# Patient Record
(demographics unavailable — no encounter records)

---

## 2025-01-29 NOTE — HISTORY OF PRESENT ILLNESS
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 Doing well overall.   #Gyn/Sexual Hx Menses: Cycles are regular, no sig dysmenorrhea Sexually active: Not currently  Contraception: Mirena IUD History of STIs: HPV Oocyte cryopreservation May/June 2024, retrieved 60 eggs w/ possible overstimulation  #History of anemia Hb ~8 June 2024 Unknown etiology  #HCM - Pap smear: UTD - Vaccines: HPV - received / Tdap - likely UTD given HCW

## 2025-01-29 NOTE — HISTORY OF PRESENT ILLNESS
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 Doing well overall.   #Gyn/Sexual Hx Menses: Cycles are regular, no sig dysmenorrhea Sexually active: Not currently  Contraception: Mirena IUD History of STIs: HPV Oocyte cryopreservation May/June 2024, retrieved 60 eggs w/ possible overstimulation  #History of anemia Hb ~8 June 2024 Unknown etiology  #HCM - Pap smear: UTD - Vaccines: HPV - received / Tdap - likely UTD given HCW

## 2025-01-29 NOTE — PHYSICAL EXAM
[No Acute Distress] : no acute distress [Well Developed] : well developed [Well Nourished] : well nourished [Well-Appearing] : well-appearing [Normal Voice/Communication] : normal voice/communication [Normal Sclera/Conjunctiva] : normal sclera/conjunctiva [EOMI] : extraocular movements intact [Normal Outer Ear/Nose] : the outer ears and nose were normal in appearance [No JVD] : no jugular venous distention [No Lymphadenopathy] : no lymphadenopathy [Supple] : supple [Thyroid Normal, No Nodules] : the thyroid was normal and there were no nodules present [No Respiratory Distress] : no respiratory distress  [Normal Rate] : normal rate  [Clear to Auscultation] : lungs were clear to auscultation bilaterally [Regular Rhythm] : with a regular rhythm [Normal S1, S2] : normal S1 and S2 [No Murmur] : no murmur heard [No Edema] : there was no peripheral edema [Soft] : abdomen soft [Non Tender] : non-tender [Non-distended] : non-distended [No Masses] : no abdominal mass palpated [No HSM] : no HSM [No Joint Swelling] : no joint swelling [Normal Gait] : normal gait [Alert and Oriented x3] : oriented to person, place, and time [Normal Insight/Judgement] : insight and judgment were intact

## 2025-01-29 NOTE — HISTORY OF PRESENT ILLNESS
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 [de-identified] : #History of depression, anxiety, ADHD Wellbutrin XL 450mg QD Adderall XR 40mg QD + 5mg IR PRN  Had a therapist in med school + residency but not any longer Sleeps well. Doing well overall.   #Gyn/Sexual Hx Menses: Cycles are regular, no sig dysmenorrhea Sexually active: Not currently  Contraception: Mirena IUD History of STIs: HPV Oocyte cryopreservation May/June 2024, retrieved 60 eggs w/ possible overstimulation  #History of anemia Hb ~8 June 2024 Unknown etiology  #HCM - Pap smear: UTD - Vaccines: HPV - received / Tdap - likely UTD given HCW

## 2025-01-29 NOTE — HEALTH RISK ASSESSMENT
[Yes] : Yes [2 - 3 times a week (3 pts)] : 2 - 3  times a week (3 points) [Little interest or pleasure doing things] : 1) Little interest or pleasure doing things [Feeling down, depressed, or hopeless] : 2) Feeling down, depressed, or hopeless [0] : 2) Feeling down, depressed, or hopeless: Not at all (0) [PHQ-2 Negative - No further assessment needed] : PHQ-2 Negative - No further assessment needed [Never] : Never [1 or 2 (0 pts)] : 1 or 2 (0 points) [No] : In the past 12 months have you used drugs other than those required for medical reasons? No [Never (0 pts)] : Never (0 points) [Audit-CScore] : 3 [de-identified] : Walks the dog, not as active otherwise  [[id number]] : 0 [de-identified] : Generally healthy diet, could focus on more protein

## 2025-01-29 NOTE — PHYSICAL EXAM
[No Acute Distress] : no acute distress [Well Developed] : well developed [Well Nourished] : well nourished [Well-Appearing] : well-appearing [Normal Voice/Communication] : normal voice/communication [Normal Sclera/Conjunctiva] : normal sclera/conjunctiva [EOMI] : extraocular movements intact [No JVD] : no jugular venous distention [Normal Outer Ear/Nose] : the outer ears and nose were normal in appearance [No Lymphadenopathy] : no lymphadenopathy [Supple] : supple [Thyroid Normal, No Nodules] : the thyroid was normal and there were no nodules present [No Respiratory Distress] : no respiratory distress  [Normal Rate] : normal rate  [Clear to Auscultation] : lungs were clear to auscultation bilaterally [Regular Rhythm] : with a regular rhythm [Normal S1, S2] : normal S1 and S2 [No Murmur] : no murmur heard [No Edema] : there was no peripheral edema [Soft] : abdomen soft [Non Tender] : non-tender [Non-distended] : non-distended [No Masses] : no abdominal mass palpated [No HSM] : no HSM [No Joint Swelling] : no joint swelling [Normal Gait] : normal gait [Alert and Oriented x3] : oriented to person, place, and time [Normal Insight/Judgement] : insight and judgment were intact

## 2025-01-29 NOTE — HEALTH RISK ASSESSMENT
[Yes] : Yes [2 - 3 times a week (3 pts)] : 2 - 3  times a week (3 points) [Little interest or pleasure doing things] : 1) Little interest or pleasure doing things [Feeling down, depressed, or hopeless] : 2) Feeling down, depressed, or hopeless [0] : 2) Feeling down, depressed, or hopeless: Not at all (0) [PHQ-2 Negative - No further assessment needed] : PHQ-2 Negative - No further assessment needed [Never] : Never [1 or 2 (0 pts)] : 1 or 2 (0 points) [No] : In the past 12 months have you used drugs other than those required for medical reasons? No [Never (0 pts)] : Never (0 points) [Audit-CScore] : 3 [de-identified] : Walks the dog, not as active otherwise  [de-identified] : Generally healthy diet, could focus on more protein  [[id number]] : 0

## 2025-01-29 NOTE — ASSESSMENT
[FreeTextEntry1] : #History of depression, anxiety, ADHD - Continue Wellbutrin XL 450mg QD, refill - Continue Adderall XR 40mg QD + 5mg IR PRN, refill  - Advised to consider ongoing psychotherapy - Check TSH  #History of anemia - Check CBC, iron panel, ferritin, B12, folate   #HCM - Pap smear: UTD - Contraception: Mirena IUD - Not currently sexually active so declining STI screening  - Vaccines: HPV - received / Tdap - likely UTD given HCW - BP/BMI wnl - Labs as ordered

## 2025-01-29 NOTE — PHYSICAL EXAM
[No Acute Distress] : no acute distress [Well Developed] : well developed [Well Nourished] : well nourished [Well-Appearing] : well-appearing [Normal Voice/Communication] : normal voice/communication [Normal Sclera/Conjunctiva] : normal sclera/conjunctiva [EOMI] : extraocular movements intact [No JVD] : no jugular venous distention [Normal Outer Ear/Nose] : the outer ears and nose were normal in appearance [No Lymphadenopathy] : no lymphadenopathy [Supple] : supple [Thyroid Normal, No Nodules] : the thyroid was normal and there were no nodules present [No Respiratory Distress] : no respiratory distress  [Normal Rate] : normal rate  [Clear to Auscultation] : lungs were clear to auscultation bilaterally [Regular Rhythm] : with a regular rhythm [Normal S1, S2] : normal S1 and S2 [No Murmur] : no murmur heard [No Edema] : there was no peripheral edema [Soft] : abdomen soft [Non Tender] : non-tender [No Masses] : no abdominal mass palpated [Non-distended] : non-distended [No HSM] : no HSM [No Joint Swelling] : no joint swelling [Normal Gait] : normal gait [Alert and Oriented x3] : oriented to person, place, and time [Normal Insight/Judgement] : insight and judgment were intact

## 2025-01-29 NOTE — HEALTH RISK ASSESSMENT
[Yes] : Yes [2 - 3 times a week (3 pts)] : 2 - 3  times a week (3 points) [Little interest or pleasure doing things] : 1) Little interest or pleasure doing things [Feeling down, depressed, or hopeless] : 2) Feeling down, depressed, or hopeless [0] : 2) Feeling down, depressed, or hopeless: Not at all (0) [PHQ-2 Negative - No further assessment needed] : PHQ-2 Negative - No further assessment needed [Never] : Never [1 or 2 (0 pts)] : 1 or 2 (0 points) [No] : In the past 12 months have you used drugs other than those required for medical reasons? No [Never (0 pts)] : Never (0 points) [Audit-CScore] : 3 [de-identified] : Walks the dog, not as active otherwise  [de-identified] : Generally healthy diet, could focus on more protein  [[id number]] : 0